# Patient Record
Sex: FEMALE | Race: WHITE | NOT HISPANIC OR LATINO | URBAN - NONMETROPOLITAN AREA
[De-identification: names, ages, dates, MRNs, and addresses within clinical notes are randomized per-mention and may not be internally consistent; named-entity substitution may affect disease eponyms.]

---

## 2020-12-01 ENCOUNTER — OFFICE VISIT CONVERTED (OUTPATIENT)
Dept: FAMILY MEDICINE CLINIC | Age: 45
End: 2020-12-01
Attending: FAMILY MEDICINE

## 2020-12-02 ENCOUNTER — HOSPITAL ENCOUNTER (OUTPATIENT)
Dept: OTHER | Facility: HOSPITAL | Age: 45
Discharge: HOME | End: 2020-12-02
Attending: FAMILY MEDICINE

## 2020-12-02 LAB
25(OH)D3 SERPL-MCNC: 27.9 NG/ML (ref 30–100)
ALBUMIN SERPL-MCNC: 4.1 G/DL (ref 3.5–5)
ALBUMIN/GLOB SERPL: 1.4 {RATIO} (ref 1.4–2.6)
ALP SERPL-CCNC: 135 U/L (ref 42–98)
ALT SERPL-CCNC: 13 U/L (ref 10–40)
ANION GAP SERPL CALC-SCNC: 17 MMOL/L (ref 8–19)
AST SERPL-CCNC: 15 U/L (ref 15–50)
BASOPHILS # BLD MANUAL: 0.02 10*3/UL (ref 0–0.2)
BASOPHILS NFR BLD MANUAL: 0.3 % (ref 0–3)
BILIRUB SERPL-MCNC: 0.51 MG/DL (ref 0.2–1.3)
BUN SERPL-MCNC: 9 MG/DL (ref 5–25)
BUN/CREAT SERPL: 13 {RATIO} (ref 6–20)
CALCIUM SERPL-MCNC: 9.5 MG/DL (ref 8.7–10.4)
CHLORIDE SERPL-SCNC: 103 MMOL/L (ref 99–111)
CHOLEST SERPL-MCNC: 162 MG/DL (ref 107–200)
CHOLEST/HDLC SERPL: 4.3 {RATIO} (ref 3–6)
CONV CO2: 22 MMOL/L (ref 22–32)
CONV TOTAL PROTEIN: 7 G/DL (ref 6.3–8.2)
CREAT UR-MCNC: 0.67 MG/DL (ref 0.5–0.9)
DEPRECATED RDW RBC AUTO: 40.1 FL
EOSINOPHIL # BLD MANUAL: 0.13 10*3/UL (ref 0–0.7)
EOSINOPHIL NFR BLD MANUAL: 1.6 % (ref 0–7)
ERYTHROCYTE [DISTWIDTH] IN BLOOD BY AUTOMATED COUNT: 12.6 % (ref 11.5–14.5)
GFR SERPLBLD BASED ON 1.73 SQ M-ARVRAT: >60 ML/MIN/{1.73_M2}
GLOBULIN UR ELPH-MCNC: 2.9 G/DL (ref 2–3.5)
GLUCOSE SERPL-MCNC: 90 MG/DL (ref 65–99)
GRANS (ABSOLUTE): 4.91 10*3/UL (ref 2–8)
GRANS: 61.7 % (ref 30–85)
HBA1C MFR BLD: 14 G/DL (ref 12–16)
HCT VFR BLD AUTO: 41.5 % (ref 37–47)
HDLC SERPL-MCNC: 38 MG/DL (ref 40–60)
IMM GRANULOCYTES # BLD: 0.01 10*3/UL (ref 0–0.54)
IMM GRANULOCYTES NFR BLD: 0.1 % (ref 0–0.43)
LDLC SERPL CALC-MCNC: 110 MG/DL (ref 70–100)
LYMPHOCYTES # BLD MANUAL: 2.31 10*3/UL (ref 1–5)
LYMPHOCYTES NFR BLD MANUAL: 7.2 % (ref 3–10)
MCH RBC QN AUTO: 29 PG (ref 27–31)
MCHC RBC AUTO-ENTMCNC: 33.7 G/DL (ref 33–37)
MCV RBC AUTO: 85.9 FL (ref 81–99)
MONOCYTES # BLD AUTO: 0.57 10*3/UL (ref 0.2–1.2)
OSMOLALITY SERPL CALC.SUM OF ELEC: 284 MOSM/KG (ref 273–304)
PLATELET # BLD AUTO: 371 10*3/UL (ref 130–400)
PMV BLD AUTO: 8.7 FL (ref 7.4–10.4)
POTASSIUM SERPL-SCNC: 4.2 MMOL/L (ref 3.5–5.3)
RBC # BLD AUTO: 4.83 10*6/UL (ref 4.2–5.4)
SODIUM SERPL-SCNC: 138 MMOL/L (ref 135–147)
TRIGL SERPL-MCNC: 71 MG/DL (ref 40–150)
TSH SERPL-ACNC: 0.98 M[IU]/L (ref 0.27–4.2)
VARIANT LYMPHS NFR BLD MANUAL: 29.1 % (ref 20–45)
VLDLC SERPL-MCNC: 14 MG/DL (ref 5–37)
WBC # BLD AUTO: 7.95 10*3/UL (ref 4.8–10.8)

## 2021-05-19 NOTE — PROGRESS NOTES
"Asia Mcmahon  1975     Office/Outpatient Visit    Visit Date: Tue, Dec 1, 2020 02:39 pm    Provider: Karthik Tony MD (Assistant: Kaylynn Gupta LPN)    Location: [facility]        Electronically signed by Karthik Tony MD on  12/01/2020 06:57:47 PM                             Subjective:        CC: Ms. Mcmahon is a 45 year old White female.  This is her first visit to the clinic.          HPI: Asia presents to clinic today to establish care.  Overall, she says she is in good health.  However, she has a couple of acute concerns that she would like addressed today.  First, she notes that she has been experiencing \"heaviness\" in her chest.  This is present more often than it is not.  There is no relation to exertion.  She denies shortness of breath, diaphoresis, edema or palpitations.  She has no prior history of cardiac issues.  Secondly, Asia reports that she has been experiencing bilateral upper extremity numbness and tingling.  She says that when she sleeps at night, both arms will \"go numb.\" This happens more frequently with the right arm, left arm.  She also notes that her right arm often developed paresthesias when she is driving.  The whole arm from the shoulder down is involved.  She denies any neck pain, shoulder pain.  She has never experienced pain like this before.  She denies any incoordination of her hands and no weakness in  strength.          PHQ-9 Depression Screening: Completed form scanned and in chart; Total Score 0     ROS:     CONSTITUTIONAL:  Negative for chills, fatigue, fever, and weight change.      EYES:  Negative for blurred vision.      E/N/T:  Negative for ear pain and tinnitus.      CARDIOVASCULAR:  Positive for chest pain ( unrelated to exertion ).   Negative for dizziness, palpitations or edema.      RESPIRATORY:  Negative for dyspnea and cough.      GASTROINTESTINAL:  Negative for abdominal pain, diarrhea, heartburn, nausea and vomiting.     "  GENITOURINARY:  Negative for dysuria, hematuria and polyuria.      HEMATOLOGIC/LYMPHATIC:  Negative for easy bruising and excessive bleeding.      MUSCULOSKELETAL:  Negative for arthralgias and myalgias.      INTEGUMENTARY/BREAST:  Negative for rash, breast mass and skin changes of breast.      NEUROLOGICAL:  Positive for paresthesias.   Negative for headaches or weakness.      ENDOCRINE:  Negative for hair loss, heat/cold intolerance, polydipsia, and polyphagia.      PSYCHIATRIC:  Negative for anxiety, depression, and sleep disturbances.          Past Medical History / Family History / Social History:         Last Reviewed on 2020 06:57 PM by Karthik Tony    Past Medical History:             GYNECOLOGICAL HISTORY:        Not currently using any form of contraception.          PREVENTIVE HEALTH MAINTENANCE             MAMMOGRAM: Done within last 2 years and results in are chart was last done 2019 with normal results     PAP SMEAR: was last done 2019 with normal results         Surgical History:         : X 1;     Procedures:    LEEP procedure (  )         Family History:     Father: Healthy     Mother: Hypertension;     Sister(s): Healthy; 2 sister(s) total     Son(s): Healthy; 1 son(s) total         Social History:     Occupation: [employer] ([address])      Marital Status:      Children: 1 child         Tobacco/Alcohol/Supplements:     Last Reviewed on 2020 06:57 PM by Karthik Tony    Tobacco: She has a past history of cigarette smoking; quit date:  2020.          Alcohol: Frequency: Once a week Socially         Substance Abuse History:     Last Reviewed on 2020 06:57 PM by Karthik Tony        Marijuana: Current use. socially         Mental Health History:     Last Reviewed on 2020 06:57 PM by Karthik Tony        Communicable Diseases (eg STDs):     Last Reviewed on 2020 06:57 PM by Karthik Tony    "     Current Problems:     Last Reviewed on 12/01/2020 06:57 PM by Karthik Tony    Chest pain, unspecified    Paresthesia of skin    Encounter for screening for depression        Immunizations:     None        Allergies:     Last Reviewed on 12/01/2020 06:57 PM by Karthik Tony    No Known Allergies.        Current Medications:     Last Reviewed on 12/01/2020 06:57 PM by Karthik Tony    No Known Medications.        Objective:        Vitals:         Current: 12/1/2020 2:41:09 PM    Ht:  5 ft, 6 in;  Wt: 223 lbs;  BMI: 36.0T: 96.8 F (oral);  BP: 134/82 mm Hg (left arm, sitting);  P: 81 bpm (left arm (BP Cuff), sitting)        Exams:     PHYSICAL EXAM:     GENERAL: vital signs recorded - well developed, well nourished;  no apparent distress;     EYES: conjunctiva and cornea are normal;     E/N/T:  normal EACs, TMs, nasal/oral mucosa, teeth, gingiva, and oropharynx;     NECK: trachea is midline; thyroid is non-palpable;     RESPIRATORY: Clear to auscultation bilaterally; no rales (\"crackles\") present; no rhonchi; no wheezes;     CARDIOVASCULAR: normal rate; rhythm is regular;  No murmurs. clicks, gallops or rubs appreciated; no edema;     GASTROINTESTINAL: nontender; Soft and nondistended; normal bowel sounds; no organomegaly; no masses;     LYMPHATIC: no enlargement of cervical or facial nodes; no supraclavicular nodes;     BREAST/INTEGUMENT: No significant rashes, lesions or suspicious moles within limits of examination;     MUSCULOSKELETAL: muscle strength: 5/5 in all major muscle groups;  normal overall tone Spurling test negative bilaterally     NEUROLOGIC: Grossly intact; mental status: alert and oriented x 3; cranial nerves II-XII grossly intact; Sensation: Intact to light touch in all 4 extermities;     PSYCHIATRIC: appropriate affect and demeanor; normal speech pattern; Normal behavior;         Lab/Test Results:         LABORATORY RESULTS: EKG performed by tls         Assessment:         R07.9   Chest pain, " unspecified       R20.2   Paresthesia of skin       Z13.31   Encounter for screening for depression           ORDERS:         Radiology/Test Orders:       45789  Electrocardiogram, routine with at least 12 leads; with interpretation and report  (In-House)              Lab Orders:       [id number]  BDCBC - H CBC with 3 part diff  (Send-Out)            31850  COMP - [facility] Comp. Metabolic Panel  (Send-Out)            98182  LPDP - [facility] Lipid Panel  (Send-Out)            11013  TSH - [facility] TSH  (Send-Out)            26158  B12FO - [facility] Vitamin B12 with Folate  (Send-Out)            21773  VITD - [facility] Vitamin D, 25 Hydroxy  (Send-Out)              Other Orders:         Depression screen negative  (In-House)                      Plan:         Chest pain, unspecified- Unclear etiology.  This does not appear to be cardiac in nature but an EKG was ordered today for further evaluation.  This was unremarkable. Fasting CMP and lipid panel ordered for further risk stratification.  If symptoms persist, will consider further work-up and/or referral. ED/return precautions given.    LABORATORY:  Labs ordered to be performed today include CBC, Comprehensive metabolic panel, lipid panel, and TSH.      TESTS/PROCEDURES:  Will proceed with an ECG to be performed/scheduled now.            Orders:       [id number]  Electrocardiogram, routine with at least 12 leads; with interpretation and report  (In-House)            52656  BDCBC - H CBC with 3 part diff  (Send-Out)            64629  COMP - H Comp. Metabolic Panel  (Send-Out)            12899  LPDP - [facility] Lipid Panel  (Send-Out)            32779  TSH - [facility] TSH  (Send-Out)              Paresthesia of skin- Unclear etiology.  Does not appear to be musculoskeletal in nature as it is bilateral and there are no concerning exam findings.  Strength and sensation are intact.  Neuropathy is a possible cause.  We will start the work-up with a basic lab draw (CBC, CMP, TSH, B12, folate, vitamin D) and if  these tests are negative, will consider further work-up if sx persist.    LABORATORY:  Labs ordered to be performed today include B12 with Folate and Vitamin D.            Orders:       92321  B12FO - [facility] Vitamin B12 with Folate  (Send-Out)            45947  VITD - [facility] Vitamin D, 25 Hydroxy  (Send-Out)              Encounter for screening for depression    MIPS PHQ-9 Depression Screening: Completed form scanned and in chart; Total Score 0; Negative Depression Screen           Orders:         Depression screen negative  (In-House)                  Charge Capture:         Primary Diagnosis:     R07.9  Chest pain, unspecified           Orders:      83217  Office visit - new pt, level 3  (In-House)            55216  Electrocardiogram, routine with at least 12 leads; with interpretation and report  (In-House)              R20.2  Paresthesia of skin     Z13.31  Encounter for screening for depression           Orders:        Depression screen negative  (In-House)                  ADDENDUMS:      ____________________________________    Addendum: 12/03/2020 11:20 AM - Karthik Tony        Orders:    Remove  [id number]    Add  [id number]    -leona

## 2021-07-02 VITALS
TEMPERATURE: 96.8 F | HEIGHT: 66 IN | BODY MASS INDEX: 35.84 KG/M2 | SYSTOLIC BLOOD PRESSURE: 134 MMHG | HEART RATE: 81 BPM | WEIGHT: 223 LBS | DIASTOLIC BLOOD PRESSURE: 82 MMHG